# Patient Record
Sex: MALE | Race: WHITE | NOT HISPANIC OR LATINO | Employment: FULL TIME | URBAN - NONMETROPOLITAN AREA
[De-identification: names, ages, dates, MRNs, and addresses within clinical notes are randomized per-mention and may not be internally consistent; named-entity substitution may affect disease eponyms.]

---

## 2023-03-29 DIAGNOSIS — F98.8 ATTENTION DEFICIT DISORDER, UNSPECIFIED HYPERACTIVITY PRESENCE: Primary | ICD-10-CM

## 2023-03-29 RX ORDER — DEXTROAMPHETAMINE SACCHARATE, AMPHETAMINE ASPARTATE MONOHYDRATE, DEXTROAMPHETAMINE SULFATE AND AMPHETAMINE SULFATE 3.75; 3.75; 3.75; 3.75 MG/1; MG/1; MG/1; MG/1
15 CAPSULE, EXTENDED RELEASE ORAL
COMMUNITY
Start: 2020-09-29 | End: 2023-03-29 | Stop reason: SDUPTHER

## 2023-03-29 RX ORDER — DEXTROAMPHETAMINE SACCHARATE, AMPHETAMINE ASPARTATE MONOHYDRATE, DEXTROAMPHETAMINE SULFATE AND AMPHETAMINE SULFATE 3.75; 3.75; 3.75; 3.75 MG/1; MG/1; MG/1; MG/1
15 CAPSULE, EXTENDED RELEASE ORAL
Qty: 90 CAPSULE | Refills: 0 | Status: SHIPPED | OUTPATIENT
Start: 2023-03-29 | End: 2023-06-26 | Stop reason: SDUPTHER

## 2023-05-22 PROBLEM — F98.8 ATTENTION DEFICIT DISORDER (ADD) IN ADULT: Status: ACTIVE | Noted: 2023-05-22

## 2023-05-22 PROBLEM — L73.0 POST-ACNE SCARRING: Status: ACTIVE | Noted: 2023-05-22

## 2023-05-22 PROBLEM — L90.5 POST-ACNE SCARRING: Status: ACTIVE | Noted: 2023-05-22

## 2023-05-23 ENCOUNTER — APPOINTMENT (OUTPATIENT)
Dept: PRIMARY CARE | Facility: CLINIC | Age: 28
End: 2023-05-23
Payer: OTHER GOVERNMENT

## 2023-06-26 DIAGNOSIS — F98.8 ATTENTION DEFICIT DISORDER, UNSPECIFIED HYPERACTIVITY PRESENCE: ICD-10-CM

## 2023-06-26 RX ORDER — DEXTROAMPHETAMINE SACCHARATE, AMPHETAMINE ASPARTATE MONOHYDRATE, DEXTROAMPHETAMINE SULFATE AND AMPHETAMINE SULFATE 3.75; 3.75; 3.75; 3.75 MG/1; MG/1; MG/1; MG/1
15 CAPSULE, EXTENDED RELEASE ORAL
Qty: 90 CAPSULE | Refills: 0 | Status: SHIPPED | OUTPATIENT
Start: 2023-06-26 | End: 2023-09-26 | Stop reason: SDUPTHER

## 2023-06-29 ENCOUNTER — OFFICE VISIT (OUTPATIENT)
Dept: PRIMARY CARE | Facility: CLINIC | Age: 28
End: 2023-06-29
Payer: OTHER GOVERNMENT

## 2023-06-29 VITALS
TEMPERATURE: 98.6 F | DIASTOLIC BLOOD PRESSURE: 73 MMHG | SYSTOLIC BLOOD PRESSURE: 120 MMHG | BODY MASS INDEX: 24.95 KG/M2 | OXYGEN SATURATION: 98 % | RESPIRATION RATE: 16 BRPM | HEART RATE: 92 BPM | WEIGHT: 189.1 LBS

## 2023-06-29 DIAGNOSIS — F98.8 ATTENTION DEFICIT DISORDER (ADD) IN ADULT: Primary | ICD-10-CM

## 2023-06-29 PROCEDURE — 99214 OFFICE O/P EST MOD 30 MIN: CPT | Performed by: FAMILY MEDICINE

## 2023-06-29 RX ORDER — AZELASTINE HYDROCHLORIDE 0.5 MG/ML
1 SOLUTION/ DROPS OPHTHALMIC 2 TIMES DAILY PRN
COMMUNITY
Start: 2023-06-12 | End: 2023-07-02 | Stop reason: WASHOUT

## 2023-06-29 NOTE — ASSESSMENT & PLAN NOTE
Well controlled on current regimen, will continue Adderall ER 15 mg daily  Prescriptions have been refilled.  If symptoms are well controlled and any side effects tolerable patient to call in 3 months for another 3 months of refills.   We will do a routine check up for ADD in the office in 6 months.     Lifestyle measures reviewed  Plans to cut back on drinking, encouraged to replace with positive rewards.  Plans to cut back on fast food, has packed lunch in the past, plans to do this again.    Controlled Substance Visit -- I have personally reviewed the OARRS report for this patient. There is copy of report in electronic medical record. I have considered the risks of abuse, dependence, addiction, and diversion. I believe that it is clinically appropriate for this patient to be prescribed this medication.     CSA last completed 11/2022.  UDS last completed 11/2022.

## 2023-06-29 NOTE — PROGRESS NOTES
"Subjective   Patient ID: Israel Jackson is a 28 y.o. male who presents for Follow-up (Pt is here for f/up meds- pt would like to discuss his bp ).    HPI     Has 5 year old daughter, will be going to .      ADD    Presently on Adderall ER 15 mg daily.  States feels \"robotic\" after taking from 9-12 then fine after.  Moving slow, answering questions slow per wife.  Takes medicine daily, even most weekends.  On days he does not take meds he does not have symptoms of moving slow but is also less focused.  Feels if he keeps himself stimulated during those hours he does not have the symptoms as much.  Also drinks coffee, unsure if related to this.    Does not feel he has issues with anxiety or depression.  When does feel anxious usually triggered by lack of sleep or drinking the night prior.  Cousin with history of anxiety.  Has done therapy for anxiety and drinking.  Drinks some weeks, has some periods where he does not drink at all.  Also uses nicotine, knows he should stop.  Thinks may need new counselor for new info/recommendations.    Was on short acting when first started meds.  Unsure if had side effects with this.    Has not been eating the best recently.  Eats some veggies, should probably increase.  Finds that he usually does not have time to eat when working.  Use to pack lunch, has not recently.  Has small breakfast, does not eat for rest of day most of the time.    Sleep is okay overall, no trouble falling asleep  Does not wake up at night unless one of the kids gets him up.    CSA last completed 11/2022.  UDS last completed 11/2022.       Review of Systems   All other systems reviewed and are negative.      Objective   /73 (BP Location: Right arm, Patient Position: Sitting, BP Cuff Size: Small adult)   Pulse 92   Temp 37 °C (98.6 °F) (Temporal)   Resp 16   Wt 85.8 kg (189 lb 1.6 oz)   SpO2 98%   BMI 24.95 kg/m²     Physical Exam  Vitals and nursing note reviewed.   Constitutional:  "      General: He is not in acute distress.     Appearance: Normal appearance. He is not toxic-appearing.   HENT:      Head: Normocephalic and atraumatic.   Cardiovascular:      Rate and Rhythm: Normal rate and regular rhythm.      Heart sounds: No murmur heard.     No friction rub. No gallop.   Pulmonary:      Effort: Pulmonary effort is normal.      Breath sounds: Normal breath sounds. No wheezing, rhonchi or rales.   Skin:     General: Skin is warm and dry.   Neurological:      General: No focal deficit present.      Mental Status: He is alert and oriented to person, place, and time.   Psychiatric:         Mood and Affect: Mood normal.         Behavior: Behavior normal.         Assessment/Plan   Problem List Items Addressed This Visit       Attention deficit disorder (ADD) in adult - Primary     Well controlled on current regimen, will continue Adderall ER 15 mg daily  Prescriptions have been refilled.  If symptoms are well controlled and any side effects tolerable patient to call in 3 months for another 3 months of refills.   We will do a routine check up for ADD in the office in 6 months.     Lifestyle measures reviewed  Plans to cut back on drinking, encouraged to replace with positive rewards.  Plans to cut back on fast food, has packed lunch in the past, plans to do this again.    Controlled Substance Visit -- I have personally reviewed the OARRS report for this patient. There is copy of report in electronic medical record. I have considered the risks of abuse, dependence, addiction, and diversion. I believe that it is clinically appropriate for this patient to be prescribed this medication.     CSA last completed 11/2022.  UDS last completed 11/2022.             GOALS FOR NEXT VISIT:  1.  Plans to cut back on drinking, encouraged to replace with positive rewards.  2.  Plans to cut back on fast food, has packed lunch in the past, plans to do this again.  3.  Look online for profiles for new  counselor.    Follow-up in 6 months for routine care + CS visit.  Call for sooner follow-up if needed.     Time Spent  Prep time on day of patient encounter: 5 minutes  Time spent directly with patient, family or caregiver: 30 minutes  Additional Time Spent on Patient Care Activities: 0 minutes  Documentation Time: 6 minutes  Other Time Spent: 0 minutes  Total: 41 minutes      Scribe Attestation  By signing my name below, IAnuradha, Gabriela   attest that this documentation has been prepared under the direction and in the presence of Stephanie Mayberry DO.

## 2023-07-03 ASSESSMENT — PATIENT HEALTH QUESTIONNAIRE - PHQ9
8. MOVING OR SPEAKING SO SLOWLY THAT OTHER PEOPLE COULD HAVE NOTICED. OR THE OPPOSITE, BEING SO FIGETY OR RESTLESS THAT YOU HAVE BEEN MOVING AROUND A LOT MORE THAN USUAL: MORE THAN HALF THE DAYS
9. THOUGHTS THAT YOU WOULD BE BETTER OFF DEAD, OR OF HURTING YOURSELF: NOT AT ALL
SUM OF ALL RESPONSES TO PHQ9 QUESTIONS 1 AND 2: 1
5. POOR APPETITE OR OVEREATING: MORE THAN HALF THE DAYS
4. FEELING TIRED OR HAVING LITTLE ENERGY: SEVERAL DAYS
7. TROUBLE CONCENTRATING ON THINGS, SUCH AS READING THE NEWSPAPER OR WATCHING TELEVISION: NOT AT ALL
1. LITTLE INTEREST OR PLEASURE IN DOING THINGS: NOT AT ALL
3. TROUBLE FALLING OR STAYING ASLEEP OR SLEEPING TOO MUCH: NOT AT ALL
2. FEELING DOWN, DEPRESSED OR HOPELESS: SEVERAL DAYS
SUM OF ALL RESPONSES TO PHQ QUESTIONS 1-9: 7
6. FEELING BAD ABOUT YOURSELF - OR THAT YOU ARE A FAILURE OR HAVE LET YOURSELF OR YOUR FAMILY DOWN: SEVERAL DAYS

## 2023-07-03 ASSESSMENT — ANXIETY QUESTIONNAIRES
7. FEELING AFRAID AS IF SOMETHING AWFUL MIGHT HAPPEN: SEVERAL DAYS
6. BECOMING EASILY ANNOYED OR IRRITABLE: MORE THAN HALF THE DAYS
1. FEELING NERVOUS, ANXIOUS, OR ON EDGE: SEVERAL DAYS
3. WORRYING TOO MUCH ABOUT DIFFERENT THINGS: SEVERAL DAYS
2. NOT BEING ABLE TO STOP OR CONTROL WORRYING: SEVERAL DAYS
4. TROUBLE RELAXING: MORE THAN HALF THE DAYS
5. BEING SO RESTLESS THAT IT IS HARD TO SIT STILL: SEVERAL DAYS
GAD7 TOTAL SCORE: 9

## 2023-09-26 DIAGNOSIS — F98.8 ATTENTION DEFICIT DISORDER, UNSPECIFIED HYPERACTIVITY PRESENCE: ICD-10-CM

## 2023-09-27 RX ORDER — DEXTROAMPHETAMINE SACCHARATE, AMPHETAMINE ASPARTATE MONOHYDRATE, DEXTROAMPHETAMINE SULFATE AND AMPHETAMINE SULFATE 3.75; 3.75; 3.75; 3.75 MG/1; MG/1; MG/1; MG/1
15 CAPSULE, EXTENDED RELEASE ORAL
Qty: 90 CAPSULE | Refills: 0 | Status: SHIPPED | OUTPATIENT
Start: 2023-09-27 | End: 2023-12-19 | Stop reason: SDUPTHER

## 2023-12-19 ENCOUNTER — TELEMEDICINE (OUTPATIENT)
Dept: PRIMARY CARE | Facility: CLINIC | Age: 28
End: 2023-12-19
Payer: OTHER GOVERNMENT

## 2023-12-19 DIAGNOSIS — Z79.899 MEDICATION MANAGEMENT: ICD-10-CM

## 2023-12-19 DIAGNOSIS — F98.8 ATTENTION DEFICIT DISORDER (ADD) IN ADULT: Primary | ICD-10-CM

## 2023-12-19 DIAGNOSIS — F98.8 ATTENTION DEFICIT DISORDER, UNSPECIFIED HYPERACTIVITY PRESENCE: ICD-10-CM

## 2023-12-19 PROCEDURE — 99213 OFFICE O/P EST LOW 20 MIN: CPT | Performed by: FAMILY MEDICINE

## 2023-12-19 RX ORDER — DEXTROAMPHETAMINE SACCHARATE, AMPHETAMINE ASPARTATE, DEXTROAMPHETAMINE SULFATE AND AMPHETAMINE SULFATE 2.5; 2.5; 2.5; 2.5 MG/1; MG/1; MG/1; MG/1
TABLET ORAL
Qty: 15 TABLET | Refills: 0 | Status: SHIPPED | OUTPATIENT
Start: 2023-12-19 | End: 2024-03-05 | Stop reason: SDUPTHER

## 2023-12-19 RX ORDER — DEXTROAMPHETAMINE SACCHARATE, AMPHETAMINE ASPARTATE MONOHYDRATE, DEXTROAMPHETAMINE SULFATE AND AMPHETAMINE SULFATE 3.75; 3.75; 3.75; 3.75 MG/1; MG/1; MG/1; MG/1
15 CAPSULE, EXTENDED RELEASE ORAL
Qty: 90 CAPSULE | Refills: 0 | Status: SHIPPED | OUTPATIENT
Start: 2023-12-19 | End: 2024-04-04 | Stop reason: SDUPTHER

## 2023-12-19 NOTE — ASSESSMENT & PLAN NOTE
Doing well with Adderall XR 15 mg daily, however, for days this wears off too early can take Adderall 10 mg IR (can cut to 5 mg if too much).     Prescriptions have been refilled.  If symptoms are well controlled and any side effects tolerable patient to call in 3 months for another 3 months of refills.   We will do a routine check up for ADD in the office in 6 months.     Lifestyle measures reviewed  Plans to cut back on drinking, encouraged to replace with positive rewards.  Plans to cut back on fast food, has packed lunch in the past, plans to do this again.    Controlled Substance Visit -- I have personally reviewed the OARRS report for this patient. There is copy of report in electronic medical record. I have considered the risks of abuse, dependence, addiction, and diversion. I believe that it is clinically appropriate for this patient to be prescribed this medication.     CSA last completed 11/2022. - VERBAL DONE 12/19/2023 since VV, will completed in person at 6 month follow-up.    UDS last completed 11/2022, repeat UDS ordered today.

## 2023-12-19 NOTE — PROGRESS NOTES
Virtual or Telephone Consent    An interactive audio and video telecommunication system which permits real time communications between the patient (at the originating site) and provider (at the distant site) was utilized to provide this telehealth service.   Verbal consent was requested and obtained from Israel Jackson on this date, 12/19/23 for a telehealth visit.     Subjective   Patient ID: Israel Jackson is a 28 y.o. male who presents for Follow-up (Pt presents for 6 month follow up CS, routine care- pt states no other concerns at this time. ).    HPI     VIRTUAL VISIT    Patient presents today for routine 6 month follow-up + CS visit.    Patient is doing well overall, they have no new concerns or issues.     ROUTINE VISIT  CHRONIC CONDITIONS:     -ADD  Taking Adderall ER 15 mg daily.  No side effects   no palp, insomnia, wt loss, irritability     Wears off about 1 or 2           could benefit from extended time on some days of the week          CSA last completed 11/2022.  UDS last completed 11/2022.     CSA content reviewed verbally today -   pt agrees verbally,    Will formally sign at next ov     Patient reports they are taking and tolerating the medications as prescribed.   Patient reports increased focus on medication. There is improved concentration, organization, and task completion.   Side effects are denied. No headaches, anorexia, weight loss, palpitations, or stomach upset.  Patient feels that the Adderall wears off after about 6 hours.  Feels he could benefit from PRN dose about 3 days per week, would not take on weekends.    Review of Systems   All other systems reviewed and are negative.    Objective   There were no vitals taken for this visit.    Physical Exam  Nursing note reviewed.     Visit conducted via telehealth in light of COVID-19 pandemic.    Video used     Gen: patient is alert and oriented x 3  pleasant, and in no apparent distress.  Patient appears well, no cough, no  dyspnea/tachypnea observed on video.   Psychiatric: Patient has good eye contact. Mood and affect are appropriate.     Assessment/Plan   Problem List Items Addressed This Visit             ICD-10-CM    Attention deficit disorder (ADD) in adult - Primary F98.8     Doing well with Adderall XR 15 mg daily, however, for days this wears off too early can take Adderall 10 mg IR (can cut to 5 mg if too much).     Prescriptions have been refilled.  If symptoms are well controlled and any side effects tolerable patient to call in 3 months for another 3 months of refills.   We will do a routine check up for ADD in the office in 6 months.     Lifestyle measures reviewed  Plans to cut back on drinking, encouraged to replace with positive rewards.  Plans to cut back on fast food, has packed lunch in the past, plans to do this again.    Controlled Substance Visit -- I have personally reviewed the OARRS report for this patient. There is copy of report in electronic medical record. I have considered the risks of abuse, dependence, addiction, and diversion. I believe that it is clinically appropriate for this patient to be prescribed this medication.     CSA last completed 11/2022. - VERBAL DONE 12/19/2023 since VV, will completed in person at 6 month follow-up.    UDS last completed 11/2022, repeat UDS ordered today.         Relevant Orders    Drug Screen, Urine With Reflex to Confirmation    Amphetamine Confirm, Urine     Other Visit Diagnoses         Codes    Medication management     Z79.899    Relevant Orders    Drug Screen, Urine With Reflex to Confirmation    Amphetamine Confirm, Urine            Follow-up in 6 months for routine care + CS visit.  Due for CSA at next visit.  Call for sooner follow-up if needed.     Scribe Attestation  By signing my name below, IAnuradha Scribe   attest that this documentation has been prepared under the direction and in the presence of Stephanie Mayberry DO.

## 2024-01-06 ENCOUNTER — LAB (OUTPATIENT)
Dept: LAB | Facility: LAB | Age: 29
End: 2024-01-06
Payer: OTHER GOVERNMENT

## 2024-01-06 DIAGNOSIS — F98.8 ATTENTION DEFICIT DISORDER (ADD) IN ADULT: ICD-10-CM

## 2024-01-06 DIAGNOSIS — Z79.899 MEDICATION MANAGEMENT: ICD-10-CM

## 2024-01-06 PROCEDURE — 80307 DRUG TEST PRSMV CHEM ANLYZR: CPT

## 2024-01-06 PROCEDURE — 80324 DRUG SCREEN AMPHETAMINES 1/2: CPT

## 2024-01-07 LAB
AMPHETAMINES UR QL SCN: ABNORMAL
BARBITURATES UR QL SCN: ABNORMAL
BENZODIAZ UR QL SCN: ABNORMAL
BZE UR QL SCN: ABNORMAL
CANNABINOIDS UR QL SCN: ABNORMAL
FENTANYL+NORFENTANYL UR QL SCN: ABNORMAL
OPIATES UR QL SCN: ABNORMAL
OXYCODONE+OXYMORPHONE UR QL SCN: ABNORMAL
PCP UR QL SCN: ABNORMAL

## 2024-01-10 LAB
AMPHET UR-MCNC: 1203 NG/ML
MDA UR-MCNC: <200 NG/ML
MDEA UR-MCNC: <200 NG/ML
MDMA UR-MCNC: <200 NG/ML
METHAMPHET UR-MCNC: <200 NG/ML
PHENTERMINE UR CFM-MCNC: <200 NG/ML

## 2024-03-05 DIAGNOSIS — F98.8 ATTENTION DEFICIT DISORDER, UNSPECIFIED HYPERACTIVITY PRESENCE: ICD-10-CM

## 2024-03-05 RX ORDER — DEXTROAMPHETAMINE SACCHARATE, AMPHETAMINE ASPARTATE, DEXTROAMPHETAMINE SULFATE AND AMPHETAMINE SULFATE 2.5; 2.5; 2.5; 2.5 MG/1; MG/1; MG/1; MG/1
TABLET ORAL
Qty: 90 TABLET | Refills: 0 | Status: SHIPPED | OUTPATIENT
Start: 2024-03-05 | End: 2024-06-11 | Stop reason: SDUPTHER

## 2024-04-04 DIAGNOSIS — F98.8 ATTENTION DEFICIT DISORDER, UNSPECIFIED HYPERACTIVITY PRESENCE: ICD-10-CM

## 2024-04-05 RX ORDER — DEXTROAMPHETAMINE SACCHARATE, AMPHETAMINE ASPARTATE MONOHYDRATE, DEXTROAMPHETAMINE SULFATE AND AMPHETAMINE SULFATE 3.75; 3.75; 3.75; 3.75 MG/1; MG/1; MG/1; MG/1
15 CAPSULE, EXTENDED RELEASE ORAL
Qty: 90 CAPSULE | Refills: 0 | Status: SHIPPED | OUTPATIENT
Start: 2024-04-05

## 2024-06-11 DIAGNOSIS — F98.8 ATTENTION DEFICIT DISORDER, UNSPECIFIED HYPERACTIVITY PRESENCE: ICD-10-CM

## 2024-06-11 RX ORDER — DEXTROAMPHETAMINE SACCHARATE, AMPHETAMINE ASPARTATE, DEXTROAMPHETAMINE SULFATE AND AMPHETAMINE SULFATE 2.5; 2.5; 2.5; 2.5 MG/1; MG/1; MG/1; MG/1
TABLET ORAL
Qty: 90 TABLET | Refills: 0 | Status: SHIPPED | OUTPATIENT
Start: 2024-06-11

## 2024-06-14 ENCOUNTER — APPOINTMENT (OUTPATIENT)
Dept: PRIMARY CARE | Facility: CLINIC | Age: 29
End: 2024-06-14
Payer: OTHER GOVERNMENT

## 2024-07-18 ENCOUNTER — PATIENT MESSAGE (OUTPATIENT)
Dept: PRIMARY CARE | Facility: CLINIC | Age: 29
End: 2024-07-18
Payer: OTHER GOVERNMENT

## 2024-07-19 DIAGNOSIS — F98.8 ATTENTION DEFICIT DISORDER, UNSPECIFIED HYPERACTIVITY PRESENCE: ICD-10-CM

## 2024-07-19 RX ORDER — DEXTROAMPHETAMINE SACCHARATE, AMPHETAMINE ASPARTATE MONOHYDRATE, DEXTROAMPHETAMINE SULFATE AND AMPHETAMINE SULFATE 3.75; 3.75; 3.75; 3.75 MG/1; MG/1; MG/1; MG/1
15 CAPSULE, EXTENDED RELEASE ORAL
Qty: 90 CAPSULE | Refills: 0 | Status: SHIPPED | OUTPATIENT
Start: 2024-07-19

## 2024-07-24 DIAGNOSIS — F98.8 ATTENTION DEFICIT DISORDER, UNSPECIFIED HYPERACTIVITY PRESENCE: ICD-10-CM

## 2024-07-24 RX ORDER — DEXTROAMPHETAMINE SACCHARATE, AMPHETAMINE ASPARTATE MONOHYDRATE, DEXTROAMPHETAMINE SULFATE AND AMPHETAMINE SULFATE 3.75; 3.75; 3.75; 3.75 MG/1; MG/1; MG/1; MG/1
15 CAPSULE, EXTENDED RELEASE ORAL
Qty: 90 CAPSULE | Refills: 0 | Status: SHIPPED | OUTPATIENT
Start: 2024-07-24

## 2024-07-24 NOTE — TELEPHONE ENCOUNTER
Israel Martinez1 [facility] Clinical Support Staff (supporting Stephanie Mayberry DO)       7/24/24  6:58 AM  Hello Goodmorning!     [facility] and they canceled the order, is there anyway to send the prescription to [facility] in [place] ?

## 2024-08-01 ENCOUNTER — APPOINTMENT (OUTPATIENT)
Dept: PRIMARY CARE | Facility: CLINIC | Age: 29
End: 2024-08-01
Payer: OTHER GOVERNMENT

## 2024-08-01 PROBLEM — E80.4 GILBERT'S SYNDROME: Status: ACTIVE | Noted: 2024-08-01

## 2024-08-01 NOTE — PROGRESS NOTES
Subjective   Patient ID: Israel Jackson is a 29 y.o. male who presents for Med Refill.    HPI     Patient presents today for routine follow-up + CS visit.    Patient concerns:  States getting over a head cold currently, sx for about 10 days now  Cough worse at night but not keeping him from sleeping.  No fevers or chills  Covid test x 2 negative per patient report    Wife got internship in marketing  Has [employer] and going camping on the weekends  Prefers to keep a busy schedule, keeps him distracted.    Notes some stress and that he is grinding his teeth at night some  Mom is dental hygienist, has made him several mouth guards in past but lost them  She is going to make him another one per patient  Does not feel that stress impacts sleep, no issues with induction or maintenance.    ROUTINE VISIT  CHRONIC CONDITIONS:     ADD    Current regimen:  Adderall XR 15 mg daily.  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      Objective   /73 (BP Location: Right arm, Patient Position: Sitting, BP Cuff Size: Large adult)   Pulse 89   Temp 36.6 °C (97.9 °F) (Temporal)   Resp 16   Wt 90.4 kg (199 lb 6.4 oz)   SpO2 98%   BMI 26.31 kg/m²     Physical Exam  Vitals and nursing note reviewed.   Constitutional:       General: He is not in acute distress.     Appearance: Normal appearance. He is not toxic-appearing.   HENT:      Head:  Normocephalic and atraumatic.      Right Ear: Tympanic membrane normal.      Left Ear: Tympanic membrane normal.      Nose: Rhinorrhea present. Rhinorrhea is clear.      Right Turbinates: Swollen.      Left Turbinates: Swollen.      Mouth/Throat:      Comments: Clear PND  Neck:      Thyroid: No thyromegaly.   Cardiovascular:      Rate and Rhythm: Normal rate and regular rhythm.      Heart sounds: No murmur heard.     No friction rub. No gallop.   Pulmonary:      Effort: Pulmonary effort is normal.      Breath sounds: Normal breath sounds. No wheezing, rhonchi or rales.   Abdominal:      Palpations: There is no mass.      Tenderness: There is no abdominal tenderness. There is no guarding.   Lymphadenopathy:      Cervical: No cervical adenopathy.   Skin:     General: Skin is warm and dry.   Neurological:      General: No focal deficit present.      Mental Status: He is alert and oriented to person, place, and time.   Psychiatric:         Mood and Affect: Mood normal.         Behavior: Behavior normal.         Assessment/Plan   Problem List Items Addressed This Visit             ICD-10-CM    Attention deficit disorder (ADD) in adult - Primary F98.8     Doing well with Adderall XR 15 mg daily, however, finds dosing wears off sooner than needed in evening even with additional Adderall IR 10 mg at lunch. Because there is some overlap of XR and IR when taking the IR at lunch I have asked that he try taking this a few hours later around 2-3 pm to see if this provides the desired coverage for the evening without further additional dosing.     Prescriptions have been refilled.  If symptoms are well controlled and any side effects tolerable patient to call in 3 months for another 3 months of refills.   We will do a routine check up for ADD in the office in 6 months.     Controlled Substance Visit -- I have personally reviewed the OARRS report for this patient. There is copy of report in electronic medical record. I have  considered the risks of abuse, dependence, addiction, and diversion. I believe that it is clinically appropriate for this patient to be prescribed this medication.     CS requirements:  CSA last completed today 8/2/2024  UDS last completed 1/2024         Relevant Orders    Amphetamine Confirm, Urine    Drug Screen, Urine With Reflex to Confirmation    RESOLVED: Need for hepatitis C screening test Z11.59    Relevant Orders    Hepatitis C Antibody    Healthcare maintenance Z00.00     Will be due for physical at 6 month follow-up, labs ordered today to be done prior.         Relevant Orders    CBC    Lipid Panel    Comprehensive Metabolic Panel    Cough R05.9     Tessalon Perles, which are called benzonatate capsules in the generic form, have been sent to pharmacy. This is a cough suppressant medication which acts by numbing the cough reflex through quieting the symptoms of inflammation/ irritation of the airways.  You make take one capsule up to three times daily as needed for cough. Most common side effect of this medication is drowsiness. I recommend taking this Tessalon Perles for the first time at bedtime so you know how this medication affects you.     Other non-prescriptions measures as discussed below.     To minimize these symptoms, we look to treat the underlying cause of poor ventilation. Swelling related to allergies or inflammation can sometimes be helped by nasal steroid sprays such as Flonase or Nasacort over time (7 - 10 days)      If secretions are THICK:  Drink at least 6-8 glasses of water daily to thin secretions.  Mucinex can be used if secretions remain thick.     If secretions are THIN, watery, and abundant:  Antihistamine such as loratadine (claritin) can help dry up a drippy nose.       A teaspoon of honey every 4 hours as needed for cough has been shown to reduce cough as well or better than over-the-counter cough suppressants. Cough drops can also be helpful.      Gargling with warm salt  water can help clear post nasal drip and soothe a sore throat.  Throat lozenges can also be helpful.       Fever or aches can be helped by taking acetaminophen (Tylenol) every four hours as needed, or ibuprofen (Motrin, Advil) or naproxen (Aleve) as directed if you are able.      Other options to help open up nasal passages and Eustachian tubes can include non-medicine intervention such as steam, essential oils such as Eucalyptus, peppermint, rosemary added to a diffuser or humidifier.     Mobilizing fluid and helping with congestion through repetitive exercise such as rebounding on a mini- trampoline, jumping jacks, or running may all help.      URIs usually improves within 2 weeks, but at times the cough will persist for three  or four weeks beyond that. If you are experiencing any shortness of breath, developing wheezing, or getting worse rather than better after the above measures, and call the office for further evaluation.           Relevant Medications    benzonatate (Tessalon) 200 mg capsule     Other Visit Diagnoses         Codes    Medication management     Z79.899    Relevant Orders    Amphetamine Confirm, Urine    Drug Screen, Urine With Reflex to Confirmation            Follow-up in 6 months for routine care + CS visit + CPE.  Labs to be done prior.   Call for sooner follow-up if needed.       Scribe Attestation  By signing my name below, IAnuradha Scribe   attest that this documentation has been prepared under the direction and in the presence of Stephanie Mayberry DO.

## 2024-08-02 ENCOUNTER — OFFICE VISIT (OUTPATIENT)
Dept: PRIMARY CARE | Facility: CLINIC | Age: 29
End: 2024-08-02
Payer: OTHER GOVERNMENT

## 2024-08-02 VITALS
OXYGEN SATURATION: 98 % | WEIGHT: 199.4 LBS | SYSTOLIC BLOOD PRESSURE: 117 MMHG | DIASTOLIC BLOOD PRESSURE: 73 MMHG | TEMPERATURE: 97.9 F | HEART RATE: 89 BPM | RESPIRATION RATE: 16 BRPM | BODY MASS INDEX: 26.31 KG/M2

## 2024-08-02 DIAGNOSIS — Z00.00 HEALTHCARE MAINTENANCE: ICD-10-CM

## 2024-08-02 DIAGNOSIS — F98.8 ATTENTION DEFICIT DISORDER (ADD) IN ADULT: Primary | ICD-10-CM

## 2024-08-02 DIAGNOSIS — Z11.59 NEED FOR HEPATITIS C SCREENING TEST: ICD-10-CM

## 2024-08-02 DIAGNOSIS — Z79.899 MEDICATION MANAGEMENT: ICD-10-CM

## 2024-08-02 DIAGNOSIS — R05.9 COUGH, UNSPECIFIED TYPE: ICD-10-CM

## 2024-08-02 PROCEDURE — 1036F TOBACCO NON-USER: CPT | Performed by: FAMILY MEDICINE

## 2024-08-02 PROCEDURE — 99214 OFFICE O/P EST MOD 30 MIN: CPT | Performed by: FAMILY MEDICINE

## 2024-08-02 RX ORDER — BENZONATATE 200 MG/1
200 CAPSULE ORAL 3 TIMES DAILY PRN
Qty: 30 CAPSULE | Refills: 2 | Status: SHIPPED | OUTPATIENT
Start: 2024-08-02 | End: 2024-09-01

## 2024-08-02 ASSESSMENT — PATIENT HEALTH QUESTIONNAIRE - PHQ9
2. FEELING DOWN, DEPRESSED OR HOPELESS: NOT AT ALL
SUM OF ALL RESPONSES TO PHQ9 QUESTIONS 1 AND 2: 0
1. LITTLE INTEREST OR PLEASURE IN DOING THINGS: NOT AT ALL

## 2024-08-02 NOTE — ASSESSMENT & PLAN NOTE
Doing well with Adderall XR 15 mg daily, however, finds dosing wears off sooner than needed in evening even with additional Adderall IR 10 mg at lunch. Because there is some overlap of XR and IR when taking the IR at lunch I have asked that he try taking this a few hours later around 2-3 pm to see if this provides the desired coverage for the evening without further additional dosing.     Prescriptions have been refilled.  If symptoms are well controlled and any side effects tolerable patient to call in 3 months for another 3 months of refills.   We will do a routine check up for ADD in the office in 6 months.     Controlled Substance Visit -- I have personally reviewed the OARRS report for this patient. There is copy of report in electronic medical record. I have considered the risks of abuse, dependence, addiction, and diversion. I believe that it is clinically appropriate for this patient to be prescribed this medication.     CS requirements:  CSA last completed today 8/2/2024  UDS last completed 1/2024

## 2024-08-02 NOTE — ASSESSMENT & PLAN NOTE
Tessalon Perles, which are called benzonatate capsules in the generic form, have been sent to pharmacy. This is a cough suppressant medication which acts by numbing the cough reflex through quieting the symptoms of inflammation/ irritation of the airways.  You make take one capsule up to three times daily as needed for cough. Most common side effect of this medication is drowsiness. I recommend taking this Tessalon Perles for the first time at bedtime so you know how this medication affects you.     Other non-prescriptions measures as discussed below.     To minimize these symptoms, we look to treat the underlying cause of poor ventilation. Swelling related to allergies or inflammation can sometimes be helped by nasal steroid sprays such as Flonase or Nasacort over time (7 - 10 days)      If secretions are THICK:  Drink at least 6-8 glasses of water daily to thin secretions.  Mucinex can be used if secretions remain thick.     If secretions are THIN, watery, and abundant:  Antihistamine such as loratadine (claritin) can help dry up a drippy nose.       A teaspoon of honey every 4 hours as needed for cough has been shown to reduce cough as well or better than over-the-counter cough suppressants. Cough drops can also be helpful.      Gargling with warm salt water can help clear post nasal drip and soothe a sore throat.  Throat lozenges can also be helpful.       Fever or aches can be helped by taking acetaminophen (Tylenol) every four hours as needed, or ibuprofen (Motrin, Advil) or naproxen (Aleve) as directed if you are able.      Other options to help open up nasal passages and Eustachian tubes can include non-medicine intervention such as steam, essential oils such as Eucalyptus, peppermint, rosemary added to a diffuser or humidifier.     Mobilizing fluid and helping with congestion through repetitive exercise such as rebounding on a mini- trampoline, jumping jacks, or running may all help.      URIs usually  improves within 2 weeks, but at times the cough will persist for three  or four weeks beyond that. If you are experiencing any shortness of breath, developing wheezing, or getting worse rather than better after the above measures, and call the office for further evaluation.

## 2024-08-28 DIAGNOSIS — F98.8 ATTENTION DEFICIT DISORDER, UNSPECIFIED HYPERACTIVITY PRESENCE: ICD-10-CM

## 2024-08-28 RX ORDER — DEXTROAMPHETAMINE SACCHARATE, AMPHETAMINE ASPARTATE, DEXTROAMPHETAMINE SULFATE AND AMPHETAMINE SULFATE 2.5; 2.5; 2.5; 2.5 MG/1; MG/1; MG/1; MG/1
TABLET ORAL
Qty: 90 TABLET | Refills: 0 | Status: SHIPPED | OUTPATIENT
Start: 2024-08-28

## 2024-10-24 DIAGNOSIS — F98.8 ATTENTION DEFICIT DISORDER (ADD) IN ADULT: ICD-10-CM

## 2024-10-24 RX ORDER — DEXTROAMPHETAMINE SACCHARATE, AMPHETAMINE ASPARTATE MONOHYDRATE, DEXTROAMPHETAMINE SULFATE AND AMPHETAMINE SULFATE 3.75; 3.75; 3.75; 3.75 MG/1; MG/1; MG/1; MG/1
15 CAPSULE, EXTENDED RELEASE ORAL
Qty: 90 CAPSULE | Refills: 0 | Status: SHIPPED | OUTPATIENT
Start: 2024-10-24

## 2024-10-24 NOTE — TELEPHONE ENCOUNTER
Israel Martinez1 [facility] Clinical Support Staff (supporting Stephanie Mayberry DO)21 minutes ago (12:32 PM)     Kayla this is Israel Jackson! I was hoping to get a refill sent to the [facility] in [place]. The prescription is 15mg Adderall XR.      Thank you have a great rest of the day     Israel Jackson

## 2024-12-02 DIAGNOSIS — F98.8 ATTENTION DEFICIT DISORDER (ADD) IN ADULT: ICD-10-CM

## 2024-12-02 RX ORDER — DEXTROAMPHETAMINE SACCHARATE, AMPHETAMINE ASPARTATE, DEXTROAMPHETAMINE SULFATE AND AMPHETAMINE SULFATE 2.5; 2.5; 2.5; 2.5 MG/1; MG/1; MG/1; MG/1
10 TABLET ORAL DAILY
Qty: 30 TABLET | Refills: 0 | Status: SHIPPED | OUTPATIENT
Start: 2025-01-31 | End: 2025-03-02

## 2024-12-02 RX ORDER — DEXTROAMPHETAMINE SACCHARATE, AMPHETAMINE ASPARTATE, DEXTROAMPHETAMINE SULFATE AND AMPHETAMINE SULFATE 2.5; 2.5; 2.5; 2.5 MG/1; MG/1; MG/1; MG/1
10 TABLET ORAL DAILY
Qty: 30 TABLET | Refills: 0 | Status: SHIPPED | OUTPATIENT
Start: 2025-01-01 | End: 2025-01-31

## 2024-12-02 RX ORDER — DEXTROAMPHETAMINE SACCHARATE, AMPHETAMINE ASPARTATE, DEXTROAMPHETAMINE SULFATE AND AMPHETAMINE SULFATE 2.5; 2.5; 2.5; 2.5 MG/1; MG/1; MG/1; MG/1
10 TABLET ORAL DAILY
Qty: 30 TABLET | Refills: 0 | Status: SHIPPED | OUTPATIENT
Start: 2024-12-02 | End: 2025-01-01

## 2025-01-20 ENCOUNTER — PATIENT MESSAGE (OUTPATIENT)
Dept: PRIMARY CARE | Facility: CLINIC | Age: 30
End: 2025-01-20

## 2025-01-20 DIAGNOSIS — F98.8 ATTENTION DEFICIT DISORDER (ADD) IN ADULT: ICD-10-CM

## 2025-01-20 RX ORDER — DEXTROAMPHETAMINE SACCHARATE, AMPHETAMINE ASPARTATE MONOHYDRATE, DEXTROAMPHETAMINE SULFATE AND AMPHETAMINE SULFATE 3.75; 3.75; 3.75; 3.75 MG/1; MG/1; MG/1; MG/1
15 CAPSULE, EXTENDED RELEASE ORAL
Qty: 90 CAPSULE | Refills: 0 | Status: SHIPPED | OUTPATIENT
Start: 2025-01-20 | End: 2025-01-20 | Stop reason: SDUPTHER

## 2025-01-20 RX ORDER — DEXTROAMPHETAMINE SACCHARATE, AMPHETAMINE ASPARTATE MONOHYDRATE, DEXTROAMPHETAMINE SULFATE AND AMPHETAMINE SULFATE 3.75; 3.75; 3.75; 3.75 MG/1; MG/1; MG/1; MG/1
15 CAPSULE, EXTENDED RELEASE ORAL
Qty: 30 CAPSULE | Refills: 0 | Status: SHIPPED | OUTPATIENT
Start: 2025-01-20

## 2025-01-20 RX ORDER — DEXTROAMPHETAMINE SACCHARATE, AMPHETAMINE ASPARTATE MONOHYDRATE, DEXTROAMPHETAMINE SULFATE AND AMPHETAMINE SULFATE 3.75; 3.75; 3.75; 3.75 MG/1; MG/1; MG/1; MG/1
15 CAPSULE, EXTENDED RELEASE ORAL
Qty: 90 CAPSULE | Refills: 0 | Status: CANCELLED | OUTPATIENT
Start: 2025-01-20

## 2025-01-20 NOTE — TELEPHONE ENCOUNTER
Israel Jackson Do [place] [address] Clinical Support Staff  Phone Number: [phone]     Cecilia,          I am in need of a 15MG Adderall XR prescription refill sent to [facility] in [place]. I am currently out.    Thank you  Israel Jackson

## 2025-01-20 NOTE — PROGRESS NOTES
Pls let pt know he needs to complete the urine drug screen asap please -     [facility]  his [facility] d rf     But he needs to resched his  appt with dr muñoz or oni in feb pls  (needs csa too at that time)

## 2025-01-21 NOTE — TELEPHONE ENCOUNTER
Rats!   Totally my fault - I did not see the [facility] part.     I will send that in instead tonight.     Sorry about that.       Pls call pt and let him know.     thx

## 2025-02-07 ENCOUNTER — APPOINTMENT (OUTPATIENT)
Dept: PRIMARY CARE | Facility: CLINIC | Age: 30
End: 2025-02-07
Payer: OTHER GOVERNMENT

## 2025-02-20 DIAGNOSIS — F98.8 ATTENTION DEFICIT DISORDER (ADD) IN ADULT: ICD-10-CM

## 2025-02-20 NOTE — TELEPHONE ENCOUNTER
Israel Jackson Do [place] [address] Clinical Support Staff  Phone Number: [phone]     Kayla,         As of today I took my last medication of adderall 15xr. I have my appointment on Feb 28th at 3:15pm for my 6 month appointment for the medication I am taking. I also came in to do the urine and blood labs you ordered. I was wondering if you could send in the prescription before our appointment. Thank you in advance !    Israel Jackson

## 2025-02-22 LAB
ALBUMIN SERPL-MCNC: 4.8 G/DL (ref 3.6–5.1)
ALP SERPL-CCNC: 72 U/L (ref 36–130)
ALT SERPL-CCNC: 16 U/L (ref 9–46)
AMPHET UR-MCNC: 1709 NG/ML
AMPHET UR-MCNC: NORMAL NG/ML
AMPHETAMINES UR QL: POSITIVE NG/ML
ANION GAP SERPL CALCULATED.4IONS-SCNC: 9 MMOL/L (CALC) (ref 7–17)
AST SERPL-CCNC: 18 U/L (ref 10–40)
BARBITURATES UR QL: NEGATIVE NG/ML
BENZODIAZ UR QL: NEGATIVE NG/ML
BILIRUB SERPL-MCNC: 1.5 MG/DL (ref 0.2–1.2)
BUN SERPL-MCNC: 15 MG/DL (ref 7–25)
BZE UR QL: NEGATIVE NG/ML
CALCIUM SERPL-MCNC: 9.5 MG/DL (ref 8.6–10.3)
CHLORIDE SERPL-SCNC: 102 MMOL/L (ref 98–110)
CHOLEST SERPL-MCNC: 216 MG/DL
CHOLEST/HDLC SERPL: 3 (CALC)
CO2 SERPL-SCNC: 28 MMOL/L (ref 20–32)
CREAT SERPL-MCNC: 0.91 MG/DL (ref 0.6–1.24)
CREAT UR-MCNC: 106.4 MG/DL
DRUG SCREEN COMMENT UR-IMP: ABNORMAL
EGFRCR SERPLBLD CKD-EPI 2021: 117 ML/MIN/1.73M2
ERYTHROCYTE [DISTWIDTH] IN BLOOD BY AUTOMATED COUNT: 12.5 % (ref 11–15)
GLUCOSE SERPL-MCNC: 85 MG/DL (ref 65–139)
HCT VFR BLD AUTO: 43.3 % (ref 38.5–50)
HCV AB SERPL QL IA: NORMAL
HDLC SERPL-MCNC: 72 MG/DL
HGB BLD-MCNC: 15.3 G/DL (ref 13.2–17.1)
LDLC SERPL CALC-MCNC: 117 MG/DL (CALC)
MCH RBC QN AUTO: 30.4 PG (ref 27–33)
MCHC RBC AUTO-ENTMCNC: 35.3 G/DL (ref 32–36)
MCV RBC AUTO: 85.9 FL (ref 80–100)
MDA UR-MCNC: NORMAL UG/ML
MDEA UR-MCNC: NORMAL NG/ML
MDMA UR-MCNC: NORMAL NG/ML
METHADONE UR QL: NEGATIVE NG/ML
METHAMPHET UR-MCNC: NEGATIVE NG/ML
METHAMPHET UR-MCNC: NORMAL UG/ML
NONHDLC SERPL-MCNC: 144 MG/DL (CALC)
OPIATES UR QL: NEGATIVE NG/ML
OXIDANTS UR QL: NEGATIVE MCG/ML
OXYCODONE UR QL: NEGATIVE NG/ML
PCP UR QL: NEGATIVE NG/ML
PH UR: 6.5 [PH] (ref 4.5–9)
PHENTERMINE UR-MCNC: NORMAL UG/ML
PLATELET # BLD AUTO: 243 THOUSAND/UL (ref 140–400)
PMV BLD REES-ECKER: 9.9 FL (ref 7.5–12.5)
POTASSIUM SERPL-SCNC: 4.6 MMOL/L (ref 3.5–5.3)
PROT SERPL-MCNC: 7.2 G/DL (ref 6.1–8.1)
QUEST NOTES AND COMMENTS: ABNORMAL
RBC # BLD AUTO: 5.04 MILLION/UL (ref 4.2–5.8)
SODIUM SERPL-SCNC: 139 MMOL/L (ref 135–146)
THC UR QL: NEGATIVE NG/ML
TRIGL SERPL-MCNC: 154 MG/DL
WBC # BLD AUTO: 6.8 THOUSAND/UL (ref 3.8–10.8)

## 2025-02-22 RX ORDER — DEXTROAMPHETAMINE SACCHARATE, AMPHETAMINE ASPARTATE MONOHYDRATE, DEXTROAMPHETAMINE SULFATE AND AMPHETAMINE SULFATE 3.75; 3.75; 3.75; 3.75 MG/1; MG/1; MG/1; MG/1
15 CAPSULE, EXTENDED RELEASE ORAL
Qty: 30 CAPSULE | Refills: 0 | Status: SHIPPED | OUTPATIENT
Start: 2025-02-22

## 2025-02-22 NOTE — TELEPHONE ENCOUNTER
BRIEF NOTE    Subjective/Objective:  MyChart refill request for Adderall XR. Patient is new to me as PCP, as Dr. Mayberry left the practice in January. Has appt with me 2/28. PDMP reviewed. He last got Adderall XR 15mg on 1/21/25 for 30 day supply.     Assessment/Plan:  1. Attention deficit disorder (ADD) in adult  - Will send to local  pharmacy.   - amphetamine-dextroamphetamine XR (Adderall XR) 15 mg 24 hr capsule; Take 1 capsule (15 mg) by mouth once every day.  Dispense: 30 capsule; Refill: 0      No problem-specific Assessment & Plan notes found for this encounter.        Rhonda Valencia DO, [place]  [facility]  Office: [phone]  2/22/2025 5:26 PM

## 2025-02-25 LAB
ALBUMIN SERPL-MCNC: 4.8 G/DL (ref 3.6–5.1)
ALP SERPL-CCNC: 72 U/L (ref 36–130)
ALT SERPL-CCNC: 16 U/L (ref 9–46)
AMPHET UR-MCNC: 1709 NG/ML
AMPHET UR-MCNC: 2030 NG/ML
AMPHETAMINES UR QL: POSITIVE NG/ML
ANION GAP SERPL CALCULATED.4IONS-SCNC: 9 MMOL/L (CALC) (ref 7–17)
AST SERPL-CCNC: 18 U/L (ref 10–40)
BARBITURATES UR QL: NEGATIVE NG/ML
BENZODIAZ UR QL: NEGATIVE NG/ML
BILIRUB SERPL-MCNC: 1.5 MG/DL (ref 0.2–1.2)
BUN SERPL-MCNC: 15 MG/DL (ref 7–25)
BZE UR QL: NEGATIVE NG/ML
CALCIUM SERPL-MCNC: 9.5 MG/DL (ref 8.6–10.3)
CHLORIDE SERPL-SCNC: 102 MMOL/L (ref 98–110)
CHOLEST SERPL-MCNC: 216 MG/DL
CHOLEST/HDLC SERPL: 3 (CALC)
CO2 SERPL-SCNC: 28 MMOL/L (ref 20–32)
CREAT SERPL-MCNC: 0.91 MG/DL (ref 0.6–1.24)
CREAT UR-MCNC: 106.4 MG/DL
DRUG SCREEN COMMENT UR-IMP: ABNORMAL
EGFRCR SERPLBLD CKD-EPI 2021: 117 ML/MIN/1.73M2
ERYTHROCYTE [DISTWIDTH] IN BLOOD BY AUTOMATED COUNT: 12.5 % (ref 11–15)
GLUCOSE SERPL-MCNC: 85 MG/DL (ref 65–139)
HCT VFR BLD AUTO: 43.3 % (ref 38.5–50)
HCV AB SERPL QL IA: NORMAL
HDLC SERPL-MCNC: 72 MG/DL
HGB BLD-MCNC: 15.3 G/DL (ref 13.2–17.1)
LDLC SERPL CALC-MCNC: 117 MG/DL (CALC)
MCH RBC QN AUTO: 30.4 PG (ref 27–33)
MCHC RBC AUTO-ENTMCNC: 35.3 G/DL (ref 32–36)
MCV RBC AUTO: 85.9 FL (ref 80–100)
MDA UR-MCNC: NEGATIVE NG/ML
MDEA UR-MCNC: NEGATIVE NG/ML
MDMA UR-MCNC: NEGATIVE NG/ML
METHADONE UR QL: NEGATIVE NG/ML
METHAMPHET UR-MCNC: NEGATIVE NG/ML
METHAMPHET UR-MCNC: NEGATIVE NG/ML
NONHDLC SERPL-MCNC: 144 MG/DL (CALC)
OPIATES UR QL: NEGATIVE NG/ML
OXIDANTS UR QL: NEGATIVE MCG/ML
OXYCODONE UR QL: NEGATIVE NG/ML
PCP UR QL: NEGATIVE NG/ML
PH UR: 6.5 [PH] (ref 4.5–9)
PHENTERMINE UR-MCNC: NEGATIVE NG/ML
PLATELET # BLD AUTO: 243 THOUSAND/UL (ref 140–400)
PMV BLD REES-ECKER: 9.9 FL (ref 7.5–12.5)
POTASSIUM SERPL-SCNC: 4.6 MMOL/L (ref 3.5–5.3)
PROT SERPL-MCNC: 7.2 G/DL (ref 6.1–8.1)
QUEST NOTES AND COMMENTS: ABNORMAL
RBC # BLD AUTO: 5.04 MILLION/UL (ref 4.2–5.8)
SODIUM SERPL-SCNC: 139 MMOL/L (ref 135–146)
THC UR QL: NEGATIVE NG/ML
TRIGL SERPL-MCNC: 154 MG/DL
WBC # BLD AUTO: 6.8 THOUSAND/UL (ref 3.8–10.8)

## 2025-02-28 ENCOUNTER — APPOINTMENT (OUTPATIENT)
Dept: PRIMARY CARE | Facility: CLINIC | Age: 30
End: 2025-02-28
Payer: OTHER GOVERNMENT

## 2025-03-04 DIAGNOSIS — F98.8 ATTENTION DEFICIT DISORDER (ADD) IN ADULT: ICD-10-CM

## 2025-03-04 RX ORDER — DEXTROAMPHETAMINE SACCHARATE, AMPHETAMINE ASPARTATE, DEXTROAMPHETAMINE SULFATE AND AMPHETAMINE SULFATE 2.5; 2.5; 2.5; 2.5 MG/1; MG/1; MG/1; MG/1
10 TABLET ORAL DAILY PRN
Qty: 30 TABLET | Refills: 0 | Status: SHIPPED | OUTPATIENT
Start: 2025-03-04

## 2025-03-04 NOTE — TELEPHONE ENCOUNTER
Israel Jackson Do [address] Clinical Support Staff  Phone Number: [phone]     Kayla and tj!       I took my last 10mg adderall today, so I would like to request a refill sent to [facility] in [place]. Thank you and have a great day    Israel Jackson

## 2025-03-05 NOTE — TELEPHONE ENCOUNTER
BRIEF NOTE    Subjective/Objective:  MyChart refill request for Adderall IR 10mg. PDMP reviewed.     Assessment/Plan:  1. Attention deficit disorder (ADD) in adult  - amphetamine-dextroamphetamine (Adderall) 10 mg tablet; Take 1 tablet (10 mg) by mouth once daily as needed (afternoon ADD symptoms).  Dispense: 30 tablet; Refill: 0      No problem-specific Assessment & Plan notes found for this encounter.        Rhonda Valencia DO, Charito  [facility]  Office: [phone]  3/4/2025 8:59 PM

## 2025-03-17 ENCOUNTER — APPOINTMENT (OUTPATIENT)
Dept: PRIMARY CARE | Facility: CLINIC | Age: 30
End: 2025-03-17
Payer: OTHER GOVERNMENT

## 2025-03-17 VITALS
SYSTOLIC BLOOD PRESSURE: 118 MMHG | BODY MASS INDEX: 27.17 KG/M2 | TEMPERATURE: 99.1 F | WEIGHT: 205.9 LBS | DIASTOLIC BLOOD PRESSURE: 80 MMHG | HEART RATE: 108 BPM | OXYGEN SATURATION: 98 %

## 2025-03-17 DIAGNOSIS — F98.8 ATTENTION DEFICIT DISORDER (ADD) IN ADULT: Primary | ICD-10-CM

## 2025-03-17 PROCEDURE — 99213 OFFICE O/P EST LOW 20 MIN: CPT | Performed by: STUDENT IN AN ORGANIZED HEALTH CARE EDUCATION/TRAINING PROGRAM

## 2025-03-17 RX ORDER — DEXTROAMPHETAMINE SACCHARATE, AMPHETAMINE ASPARTATE MONOHYDRATE, DEXTROAMPHETAMINE SULFATE AND AMPHETAMINE SULFATE 6.25; 6.25; 6.25; 6.25 MG/1; MG/1; MG/1; MG/1
25 CAPSULE, EXTENDED RELEASE ORAL EVERY MORNING
Qty: 30 CAPSULE | Refills: 0 | Status: SHIPPED | OUTPATIENT
Start: 2025-03-17 | End: 2025-04-16

## 2025-03-17 RX ORDER — DEXTROAMPHETAMINE SACCHARATE, AMPHETAMINE ASPARTATE MONOHYDRATE, DEXTROAMPHETAMINE SULFATE AND AMPHETAMINE SULFATE 6.25; 6.25; 6.25; 6.25 MG/1; MG/1; MG/1; MG/1
25 CAPSULE, EXTENDED RELEASE ORAL EVERY MORNING
Qty: 30 CAPSULE | Refills: 0 | Status: SHIPPED | OUTPATIENT
Start: 2025-04-16 | End: 2025-05-16

## 2025-03-17 RX ORDER — DEXTROAMPHETAMINE SACCHARATE, AMPHETAMINE ASPARTATE MONOHYDRATE, DEXTROAMPHETAMINE SULFATE AND AMPHETAMINE SULFATE 6.25; 6.25; 6.25; 6.25 MG/1; MG/1; MG/1; MG/1
25 CAPSULE, EXTENDED RELEASE ORAL EVERY MORNING
Qty: 30 CAPSULE | Refills: 0 | Status: SHIPPED | OUTPATIENT
Start: 2025-05-16 | End: 2025-06-15

## 2025-03-17 NOTE — PATIENT INSTRUCTIONS
- Option 1: Add Celexa to your Adderall  - Option 2: Switch you Adderall to Concerta (methylphenidate) or Vyvnase (lisdexamfetamine) -- typically once per day dose   - Option 3: Switch to Strattera (atomoxetine) -- once a day dose

## 2025-03-17 NOTE — PROGRESS NOTES
FAMILY MEDICINE  OFFICE VISIT   Israel Jackson  [id number]  1995    PCP: Rhonda Valencia DO     Chief Complaint:   Chief Complaint   Patient presents with    Follow-up     Pt presents for a 6 month follow up- pt states no concerns/questions at this time.     SUBJECTIVE     Israel Jackson is a 30 y.o. English-speaking male, who presents to the clinic with complaints of follow-up.    Reviewed labs from 2/20 toady.     HLD     ADHD  - Sometimes delays taking on the weekend. Wife says that he is stimming.   - Symptoms of hyperactivity and inattentiveness   - Home videos from when was little, very obvious  - BP: 118/80  - Tachycardia: no  - Jitteriness: no  - Caffeine intake: no  - Appetite: no  - Weight loss: no  - Sleep: no issues  - Dose effective: yes  - PDMP: yes  - Usually takes around 630-8am of the XR 15mg. Takes   - Has 2 kids and wife full time school. 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     Controlled Substances  - UDS collected: 2/20  - PDMP reviewed: 3/17  - THC use: denies  - Medical Marijuana: denies  - Illegal substance use: denies  - Do they use controlled substance daily: yes      HPI      The following portions of the patient's chart were reviewed in this encounter and updated as appropriate:  Tobacco  Allergies  Meds  Problems  Med Hx  Surg Hx  Fam Hx         Home Medication List:  Current Outpatient Medications   Medication Instructions    amphetamine-dextroamphetamine (Adderall) 10 mg tablet 10 mg, oral, Daily    amphetamine-dextroamphetamine (Adderall) 10 mg tablet 10 mg, oral, Daily    amphetamine-dextroamphetamine (Adderall) 10 mg tablet 10 mg, oral, Daily    amphetamine-dextroamphetamine (Adderall) 10 mg tablet 10 mg, oral, Daily PRN    amphetamine-dextroamphetamine XR (Adderall XR) 15 mg 24 hr capsule 15 mg, oral, Every Day         OBJECTIVE   /80 (BP Location: Right arm, Patient Position: Sitting, BP Cuff Size: Adult)   Pulse 108   Temp 37.3 °C (99.1 °F) (Temporal)   Wt 93.4 kg (205 lb  14.4 oz)   SpO2 98%   BMI 27.17 kg/m²   Vital signs and pulse oximetry reviewed.     Physical Exam  Vitals and nursing note reviewed.   Constitutional:       Appearance: Normal appearance.   HENT:      Head: Normocephalic and atraumatic.      Right Ear: External ear normal.      Left Ear: External ear normal.      Nose: Nose normal. No congestion or rhinorrhea.   Eyes:      General: No scleral icterus.     Conjunctiva/sclera: Conjunctivae normal.   Cardiovascular:      Rate and Rhythm: Normal rate and regular rhythm.      Heart sounds: No murmur heard.  Pulmonary:      Effort: Pulmonary effort is normal. No respiratory distress.      Breath sounds: Normal breath sounds. No wheezing, rhonchi or rales.   Musculoskeletal:      Right lower leg: No edema.      Left lower leg: No edema.   Skin:     General: Skin is warm.      Coloration: Skin is not jaundiced.   Neurological:      Mental Status: He is alert. Mental status is at baseline.   Psychiatric:         Mood and Affect: Mood is anxious. Mood is not depressed. Affect is not labile, flat or tearful.         Behavior: Behavior is hyperactive.         ASSESSMENT & PLAN     Problem List Items Addressed This Visit       Attention deficit disorder (ADD) in adult - Primary    Overview     Current regimen:  Adderall XR 15 mg daily.  Adderall IR 10 mg in afternoon    CS requirements:  CSA last completed 8/2024  UDS last completed 1/2024         Current Assessment & Plan     Patient is here for ADD/ADHD follow-up. Currently regimen: Adderall XR 15mg qAM, IR 10mg qAfternoon. Patient reports this dose is working okay for them, but not great.   - We discussed a number of options today for treatment, including:  - Option 1: Add Celexa to your Adderall  - Option 2: Switch you Adderall to Concerta (methylphenidate) or Vyvnase (lisdexamfetamine) -- typically once per day dose   - Option 3: Switch to Strattera (atomoxetine) -- once a day dose   - Option 4: Increase Adderall XR to  25mg keep 10mg   - Patient elected to go with Option 4 at this time, increase AM XR dose to 25mg and keep afternoon the same.   - UDS collected.   - CSA signed: yes.   - PDMP reviewed.   - I discussed with patient if UDS comes back as expected, their medication will be sent to the pharmacy.   - No evidence of tachycardia, jitteriness,decreased appetite, uncontrolled weight loss, issues with sleep.   - Patient will need to return q3mo for follow-up.   - He is going to think if he wants to change the regimen in the future to one of the following above.          Relevant Medications    amphetamine-dextroamphetamine XR (Adderall XR) 25 mg 24 hr capsule    amphetamine-dextroamphetamine XR (Adderall XR) 25 mg 24 hr capsule (Start on 4/16/2025)    amphetamine-dextroamphetamine XR (Adderall XR) 25 mg 24 hr capsule (Start on 5/16/2025)    Other Relevant Orders    Follow Up In [facility] - PCP - Established    Follow Up In [facility] - PCP - Established         Follow-Up Recommendations: q3mo for CS    Please excuse any typos or grammatical errors, part of this note was constructed with Dragon dictation software.    Rhonda Valencia DO, MSEd  [facility]   Office: [phone]  3/17/2025 10:59 PM

## 2025-04-04 ENCOUNTER — PATIENT MESSAGE (OUTPATIENT)
Dept: PRIMARY CARE | Facility: CLINIC | Age: 30
End: 2025-04-04
Payer: OTHER GOVERNMENT

## 2025-04-04 DIAGNOSIS — F98.8 ATTENTION DEFICIT DISORDER (ADD) IN ADULT: Primary | ICD-10-CM

## 2025-04-04 DIAGNOSIS — F98.8 ATTENTION DEFICIT DISORDER (ADD) IN ADULT: ICD-10-CM

## 2025-04-04 RX ORDER — DEXTROAMPHETAMINE SACCHARATE, AMPHETAMINE ASPARTATE, DEXTROAMPHETAMINE SULFATE AND AMPHETAMINE SULFATE 2.5; 2.5; 2.5; 2.5 MG/1; MG/1; MG/1; MG/1
10 TABLET ORAL DAILY PRN
Qty: 30 TABLET | Refills: 0 | Status: CANCELLED | OUTPATIENT
Start: 2025-04-04

## 2025-04-05 RX ORDER — DEXTROAMPHETAMINE SACCHARATE, AMPHETAMINE ASPARTATE, DEXTROAMPHETAMINE SULFATE AND AMPHETAMINE SULFATE 2.5; 2.5; 2.5; 2.5 MG/1; MG/1; MG/1; MG/1
10 TABLET ORAL DAILY
Qty: 30 TABLET | Refills: 0 | Status: SHIPPED | OUTPATIENT
Start: 2025-06-04 | End: 2025-07-04

## 2025-04-05 RX ORDER — DEXTROAMPHETAMINE SACCHARATE, AMPHETAMINE ASPARTATE, DEXTROAMPHETAMINE SULFATE AND AMPHETAMINE SULFATE 2.5; 2.5; 2.5; 2.5 MG/1; MG/1; MG/1; MG/1
10 TABLET ORAL DAILY
Qty: 30 TABLET | Refills: 0 | Status: SHIPPED | OUTPATIENT
Start: 2025-04-05 | End: 2025-05-05

## 2025-04-05 RX ORDER — DEXTROAMPHETAMINE SACCHARATE, AMPHETAMINE ASPARTATE, DEXTROAMPHETAMINE SULFATE AND AMPHETAMINE SULFATE 2.5; 2.5; 2.5; 2.5 MG/1; MG/1; MG/1; MG/1
10 TABLET ORAL DAILY
Qty: 30 TABLET | Refills: 0 | Status: SHIPPED | OUTPATIENT
Start: 2025-05-05 | End: 2025-06-04

## 2025-04-05 NOTE — PATIENT COMMUNICATION
BRIEF NOTE    Subjective/Objective:  Found a better regimen for the patient. Has better control of symptoms with:  - Adderall XR 25mg QAM  - Adderall IR 10mg qAfternoon     Will continue this regimen.     Assessment/Plan:  1. Attention deficit disorder (ADD) in adult (Primary)  - amphetamine-dextroamphetamine (Adderall) 10 mg tablet; Take 1 tablet (10 mg) by mouth once daily.  Dispense: 30 tablet; Refill: 0  - amphetamine-dextroamphetamine (Adderall) 10 mg tablet; Take 1 tablet (10 mg) by mouth once daily. Do not fill before May 5, 2025.  Dispense: 30 tablet; Refill: 0  - amphetamine-dextroamphetamine (Adderall) 10 mg tablet; Take 1 tablet (10 mg) by mouth once daily. Do not fill before June 4, 2025.  Dispense: 30 tablet; Refill: 0      No problem-specific Assessment & Plan notes found for this encounter.        Rhonda Valencia DO, Charito  [facility]  Office: [phone]  4/5/2025 5:45 PM

## 2025-04-05 NOTE — TELEPHONE ENCOUNTER
Duplicate request. Already sent in separate encounter.     Rhonda Valencia DO, MSEd  [facility]  Office: [phone]  4/5/2025 5:51 PM

## 2025-06-17 ENCOUNTER — APPOINTMENT (OUTPATIENT)
Dept: PRIMARY CARE | Facility: CLINIC | Age: 30
End: 2025-06-17
Payer: OTHER GOVERNMENT

## 2025-06-17 DIAGNOSIS — F98.8 ATTENTION DEFICIT DISORDER (ADD) IN ADULT: ICD-10-CM

## 2025-06-17 RX ORDER — DEXTROAMPHETAMINE SACCHARATE, AMPHETAMINE ASPARTATE, DEXTROAMPHETAMINE SULFATE AND AMPHETAMINE SULFATE 2.5; 2.5; 2.5; 2.5 MG/1; MG/1; MG/1; MG/1
10 TABLET ORAL DAILY
Qty: 30 TABLET | Refills: 0 | Status: SHIPPED | OUTPATIENT
Start: 2025-08-31 | End: 2025-09-30

## 2025-06-17 RX ORDER — DEXTROAMPHETAMINE SACCHARATE, AMPHETAMINE ASPARTATE, DEXTROAMPHETAMINE SULFATE AND AMPHETAMINE SULFATE 2.5; 2.5; 2.5; 2.5 MG/1; MG/1; MG/1; MG/1
10 TABLET ORAL DAILY
Qty: 30 TABLET | Refills: 0 | Status: SHIPPED | OUTPATIENT
Start: 2025-07-02 | End: 2025-08-01

## 2025-06-17 RX ORDER — DEXTROAMPHETAMINE SACCHARATE, AMPHETAMINE ASPARTATE MONOHYDRATE, DEXTROAMPHETAMINE SULFATE AND AMPHETAMINE SULFATE 6.25; 6.25; 6.25; 6.25 MG/1; MG/1; MG/1; MG/1
25 CAPSULE, EXTENDED RELEASE ORAL EVERY MORNING
Qty: 30 CAPSULE | Refills: 0 | Status: SHIPPED | OUTPATIENT
Start: 2025-08-16 | End: 2025-09-15

## 2025-06-17 RX ORDER — DEXTROAMPHETAMINE SACCHARATE, AMPHETAMINE ASPARTATE, DEXTROAMPHETAMINE SULFATE AND AMPHETAMINE SULFATE 2.5; 2.5; 2.5; 2.5 MG/1; MG/1; MG/1; MG/1
10 TABLET ORAL DAILY
Qty: 30 TABLET | Refills: 0 | Status: SHIPPED | OUTPATIENT
Start: 2025-08-01 | End: 2025-08-31

## 2025-06-17 RX ORDER — DEXTROAMPHETAMINE SACCHARATE, AMPHETAMINE ASPARTATE MONOHYDRATE, DEXTROAMPHETAMINE SULFATE AND AMPHETAMINE SULFATE 6.25; 6.25; 6.25; 6.25 MG/1; MG/1; MG/1; MG/1
25 CAPSULE, EXTENDED RELEASE ORAL EVERY MORNING
Qty: 30 CAPSULE | Refills: 0 | Status: SHIPPED | OUTPATIENT
Start: 2025-07-17 | End: 2025-08-16

## 2025-06-17 RX ORDER — DEXTROAMPHETAMINE SACCHARATE, AMPHETAMINE ASPARTATE MONOHYDRATE, DEXTROAMPHETAMINE SULFATE AND AMPHETAMINE SULFATE 6.25; 6.25; 6.25; 6.25 MG/1; MG/1; MG/1; MG/1
25 CAPSULE, EXTENDED RELEASE ORAL EVERY MORNING
Qty: 30 CAPSULE | Refills: 0 | Status: SHIPPED | OUTPATIENT
Start: 2025-06-17 | End: 2025-07-17

## 2025-06-17 NOTE — PROGRESS NOTES
FAMILY MEDICINE  TELEHEALTH VISIT   Israel Jackson  [id number]  1995    PCP: Rhonda Valencia DO     Chief Complaint:   Chief Complaint   Patient presents with    Follow-up     Pt presents for virtual visit follow up ADD- pt states no new concerns/questions at this time.         Method of Telehealth Encounter: Video  Patient Location During Encounter: Home  Physician Location During Encounter: Office    CONSENT  I conducted this virtual encounter with Israel Jackson via secure, live Video conference. Patient confirmed identity via date of birth. Prior to the interview, the risks and benefits of telemedicine were discussed with the patient and verbal consent was obtained.     SUBJECTIVE     Israel Jackson is a 30 y.o. English-speaking male with pertinent PMHx of ADD/ADHD, who is a telehealth encounter today for follow-up.     History of Present Illness  Israel Jackson is a 30 year old male with ADHD who presents for medication management.    He is currently on an adjusted medication regimen for ADHD, which includes Adderall 25 mg in the morning and 10 mg in the afternoon. This adjustment has been beneficial, providing the needed duration of effect that was previously lacking.    No issues with sleep since the medication adjustment and no adverse effects from the current dosage. He recently picked up his prescriptions.    Getting 6hrs of sleep, no worse issues with increase dose.       HPI      The following portions of the patient's chart were reviewed in this encounter and updated as appropriate:         Home Medication List:  Current Outpatient Medications   Medication Instructions    amphetamine-dextroamphetamine (Adderall) 10 mg tablet 10 mg, oral, Daily    amphetamine-dextroamphetamine (Adderall) 10 mg tablet 10 mg, oral, Daily    amphetamine-dextroamphetamine (Adderall) 10 mg tablet 10 mg, oral, Daily    amphetamine-dextroamphetamine (Adderall) 10 mg tablet 10 mg, oral, Daily     amphetamine-dextroamphetamine (Adderall) 10 mg tablet 10 mg, oral, Daily    amphetamine-dextroamphetamine (Adderall) 10 mg tablet 10 mg, oral, Daily    [START ON 7/2/2025] amphetamine-dextroamphetamine (Adderall) 10 mg tablet 10 mg, oral, Daily    [START ON 8/1/2025] amphetamine-dextroamphetamine (Adderall) 10 mg tablet 10 mg, oral, Daily    [START ON 8/31/2025] amphetamine-dextroamphetamine (Adderall) 10 mg tablet 10 mg, oral, Daily    amphetamine-dextroamphetamine XR (Adderall XR) 15 mg 24 hr capsule 15 mg, oral, Every Day    amphetamine-dextroamphetamine XR (Adderall XR) 25 mg 24 hr capsule 25 mg, oral, Every morning, Do not crush or chew.    amphetamine-dextroamphetamine XR (Adderall XR) 25 mg 24 hr capsule 25 mg, oral, Every morning, Do not crush or chew.    amphetamine-dextroamphetamine XR (Adderall XR) 25 mg 24 hr capsule 25 mg, oral, Every morning, Do not crush or chew.    amphetamine-dextroamphetamine XR (Adderall XR) 25 mg 24 hr capsule 25 mg, oral, Every morning, Do not crush or chew.    [START ON 7/17/2025] amphetamine-dextroamphetamine XR (Adderall XR) 25 mg 24 hr capsule 25 mg, oral, Every morning, Do not crush or chew.    [START ON 8/16/2025] amphetamine-dextroamphetamine XR (Adderall XR) 25 mg 24 hr capsule 25 mg, oral, Every morning, Do not crush or chew.         OBJECTIVE   There were no vitals taken for this visit.  Vital signs and pulse oximetry were limited as patient does not have appropriate blood pressure cuff or pulse ox at home.     Physical Exam      Results      Physical exam could not be performed due to the nature that this appointment was a telehealth appointment via phone. Video was not available due to connection issues.    ASSESSMENT & PLAN     Problem List Items Addressed This Visit       Attention deficit disorder (ADD) in adult    Overview   Current regimen:  Adderall XR 15 mg daily.  Adderall IR 10 mg in afternoon    CS requirements:  CSA last completed 8/2024  UDS last  completed 1/2024         Relevant Medications    amphetamine-dextroamphetamine XR (Adderall XR) 25 mg 24 hr capsule    amphetamine-dextroamphetamine XR (Adderall XR) 25 mg 24 hr capsule (Start on 7/17/2025)    amphetamine-dextroamphetamine XR (Adderall XR) 25 mg 24 hr capsule (Start on 8/16/2025)    amphetamine-dextroamphetamine (Adderall) 10 mg tablet (Start on 7/2/2025)    amphetamine-dextroamphetamine (Adderall) 10 mg tablet (Start on 8/1/2025)    amphetamine-dextroamphetamine (Adderall) 10 mg tablet (Start on 8/31/2025)    Other Relevant Orders    Follow Up In [facility] - PCP - Established       Assessment & Plan  Attention Deficit Hyperactivity Disorder (ADHD)  On Adderall XR 25 mg AM and Adderall IR 10 mg PM. Reports improved symptom control and duration. No sleep issues. Satisfied with dosing. Informed consent on potential adjustments. Reassessment in September.  - Continue Adderall XR 25 mg AM.  - Continue Adderall IR 10 mg PM.  - Send three-month prescriptions for both medications.  - Instruct to contact pharmacy when medication is low.  - Schedule follow-up in September for reassessment.      Level 3  Time Spent with Patient for Appointment: 15min     Follow-Up Recommendations: 3mo    Please excuse any typos or grammatical errors, part of this note was constructed with Dragon dictation software.    This medical note was created with the assistance of artificial intelligence (AI) for documentation purposes. The content has been reviewed and confirmed by the healthcare provider for accuracy and completeness. Patient consented to the use of audio recording and use of AI during their visit.         Rhonda Valencia DO, Charito  [facility]   Office: [phone]  6/17/2025 2:41 PM

## 2025-09-18 ENCOUNTER — APPOINTMENT (OUTPATIENT)
Dept: PRIMARY CARE | Facility: CLINIC | Age: 30
End: 2025-09-18
Payer: OTHER GOVERNMENT